# Patient Record
Sex: FEMALE | Race: WHITE
[De-identification: names, ages, dates, MRNs, and addresses within clinical notes are randomized per-mention and may not be internally consistent; named-entity substitution may affect disease eponyms.]

---

## 2019-10-14 ENCOUNTER — HOSPITAL ENCOUNTER (EMERGENCY)
Dept: HOSPITAL 41 - JD.ED | Age: 44
Discharge: HOME | End: 2019-10-14
Payer: SELF-PAY

## 2019-10-14 DIAGNOSIS — K27.9: ICD-10-CM

## 2019-10-14 DIAGNOSIS — R11.2: Primary | ICD-10-CM

## 2019-10-14 DIAGNOSIS — R10.13: ICD-10-CM

## 2019-10-14 DIAGNOSIS — Z79.899: ICD-10-CM

## 2019-10-14 DIAGNOSIS — K90.9: ICD-10-CM

## 2019-10-14 DIAGNOSIS — Z88.8: ICD-10-CM

## 2019-10-14 DIAGNOSIS — I10: ICD-10-CM

## 2019-10-14 DIAGNOSIS — K21.9: ICD-10-CM

## 2019-10-14 DIAGNOSIS — R10.11: ICD-10-CM

## 2019-10-14 PROCEDURE — 81001 URINALYSIS AUTO W/SCOPE: CPT

## 2019-10-14 PROCEDURE — 83735 ASSAY OF MAGNESIUM: CPT

## 2019-10-14 PROCEDURE — 85007 BL SMEAR W/DIFF WBC COUNT: CPT

## 2019-10-14 PROCEDURE — 96374 THER/PROPH/DIAG INJ IV PUSH: CPT

## 2019-10-14 PROCEDURE — 83690 ASSAY OF LIPASE: CPT

## 2019-10-14 PROCEDURE — 80053 COMPREHEN METABOLIC PANEL: CPT

## 2019-10-14 PROCEDURE — 36415 COLL VENOUS BLD VENIPUNCTURE: CPT

## 2019-10-14 PROCEDURE — 82977 ASSAY OF GGT: CPT

## 2019-10-14 PROCEDURE — 85027 COMPLETE CBC AUTOMATED: CPT

## 2019-10-14 PROCEDURE — 86140 C-REACTIVE PROTEIN: CPT

## 2019-10-14 PROCEDURE — 96375 TX/PRO/DX INJ NEW DRUG ADDON: CPT

## 2019-10-14 PROCEDURE — 99284 EMERGENCY DEPT VISIT MOD MDM: CPT

## 2019-10-14 PROCEDURE — 96361 HYDRATE IV INFUSION ADD-ON: CPT

## 2019-10-14 NOTE — EDM.PDOC
ED HPI GENERAL MEDICAL PROBLEM





- General


Chief Complaint: Gastrointestinal Problem


Stated Complaint: BLOOD IN STOOL/VOMITING/DIARRHEA/PAIN


Time Seen by Provider: 10/14/19 08:51


Source of Information: Reports: Patient


History Limitations: Reports: No Limitations





- History of Present Illness


INITIAL COMMENTS - FREE TEXT/NARRATIVE: 





44-year-old female presents the ED with recurrent nausea and vomiting and 

diarrhea development overnight. There is some blood in the stool. She has 

alternating hard constipated stools with diarrhea stools. She is being worked 

up at present for peptic ulcer disease as well as sludge within her 

gallbladder. She is booked for upper GI endoscopy tomorrow with . Been 

up all night vomiting and having diarrhea. Diffuse abdominal cramps but mostly 

the pain is right upper quadrant epigastric rating around the right costal 

margin into her right flank area. Characteristic of  biliary colic. She did 

have potato soup yesterday which didn't seem to agree with her. Emesis 

overnight has been all bilious without blood.


Onset: Sudden


Onset Date: 10/13/19


Onset Time: 20:00


Duration: Hour(s):


Location: Reports: Abdomen


Quality: Reports: Ache (Recurrent nausea vomiting and severe right upper 

quadrant abdominal pain associated diarrhea with flecks of blood in it.), 

Pressure, Throbbing, Other


Severity: Severe (Colicky component to the right upper quadrant abdominal pain)


Improves with: Reports: None


Worsens with: Reports: Other


Associated Symptoms: Reports: No Other Symptoms, Loss of Appetite, Malaise, 

Nausea/Vomiting, Other (Diarrhea stools 4 overnight.).  Denies: Confusion, 

Chest Pain, Cough, cough w sputum, Diaphoresis, Fever/Chills, Headaches, Rash, 

Shortness of Breath, Syncope


Treatments PTA: Reports: Other (see below) (None.)


  ** Right Upper Abdominal


Pain Score (Numeric/FACES): 9





- Related Data


 Allergies











Allergy/AdvReac Type Severity Reaction Status Date / Time


 


sumatriptan [From Imitrex] Allergy  Respiratory Verified 10/14/19 08:50





   Distress  











Home Meds: 


 Home Meds





Dicyclomine [Bentyl] 20 mg PO Q6H PRN #8 tablet 10/14/19 [Rx]


Lisinopril 10 mg PO DAILY 10/14/19 [History]


Ondansetron [Zofran] 4 mg BUCCAL Q6H PRN #8 tab 10/14/19 [Rx]


Ranitidine HCl [Zantac 75] 75 mg PO DAILY 10/14/19 [History]


Sucralfate [Carafate] 1 gm PO DAILY 10/14/19 [History]


oxyCODONE HCl/Acetaminophen [Percocet 5-325 mg Tablet] 1 - 2 each PO Q4H PRN #

12 tablet 10/14/19 [Rx]











Past Medical History


Cardiovascular History: Reports: Hypertension


Gastrointestinal History: Reports: GERD, PUD.  Denies: Cholelithiasis (

Diagnosis sludge in her gallbladder.)





Social & Family History





- Living Situation & Occupation


Living situation: Reports: Single


Occupation: Unemployed





ED ROS GENERAL





- Review of Systems


Review Of Systems: See Below


Constitutional: Reports: Chills, Malaise, Weakness, Fatigue, Decreased Appetite

, Weight Loss.  Denies: Fever


HEENT: Reports: No Symptoms


Respiratory: Reports: No Symptoms


Cardiovascular: Reports: Blood Pressure Problem


Endocrine: Reports: Fatigue


GI/Abdominal: Reports: Abdominal Pain (Right upper quadrant abdominal pain 

rating to her right flank along the right costal margin characteristic of 

biliary colic.), Constipation (Bowel syndrome with combination of constipation 

diarrhea.), Hematochezia


: Reports: No Symptoms


Musculoskeletal: Reports: No Symptoms


Skin: Reports: No Symptoms


Neurological: Reports: No Symptoms


Psychiatric: Reports: No Symptoms





ED EXAM, GI/ABD





- Physical Exam


Exam: See Below


Exam Limited By: No Limitations


General Appearance: Alert, WD/WN, Moderate Distress


Eyes: Bilateral: Normal Appearance


Throat/Mouth: Other


Head: Atraumatic, Normocephalic


Neck: Normal Inspection, Supple, Non-Tender, Full Range of Motion.  No: 

Lymphadenopathy (R), Tender Midline


Respiratory/Chest: No Respiratory Distress, Lungs Clear, Normal Breath Sounds, 

No Accessory Muscle Use, Chest Non-Tender


Cardiovascular: Normal Peripheral Pulses, No Edema, No Gallop, No Murmur, No Rub

, Tachycardia


GI/Abdominal Exam: No Organomegaly, Guarding, Tender, Abnormal Bowel Sounds (

Bowel sounds are quite active in all 4 quadrants.), Other (Positive Greco's 

sign.).  No: Rigid, Rebound (Very tender to palpation epigastric right upper 

quadrant of the abdomen with guarding)


Extremities: Normal Range of Motion, Non-Tender


Neurological: Alert, Oriented, CN II-XII Intact, Normal Cognition, No Motor/

Sensory Deficits


Psychiatric: Anxious, Other


Skin Exam: Warm, Dry, Intact (In a lot of pain.), Normal Color, No Rash





Course





- Vital Signs


Last Recorded V/S: 


 Last Vital Signs











Temp  37.0 C   10/14/19 10:20


 


Pulse  76   10/14/19 10:20


 


Resp  16   10/14/19 10:20


 


BP  121/78   10/14/19 10:20


 


Pulse Ox  98   10/14/19 10:20














- Orders/Labs/Meds


Labs: 


 Laboratory Tests











  10/14/19 10/14/19 10/14/19 Range/Units





  09:05 09:05 10:05 


 


WBC  8.03    (3.98-10.04)  K/mm3


 


RBC  5.01    (3.98-5.22)  M/mm3


 


Hgb  12.9    (11.2-15.7)  gm/dl


 


Hct  40.1    (34.1-44.9)  %


 


MCV  80.0    (79.4-94.8)  fl


 


MCH  25.7    (25.6-32.2)  pg


 


MCHC  32.2    (32.2-35.5)  g/dl


 


RDW Std Deviation  38.1    (36.4-46.3)  fL


 


Plt Count  383 H    (182-369)  K/mm3


 


MPV  11.2    (9.4-12.3)  fl


 


Neutrophils % (Manual)  65 H    (40-60)  %


 


Band Neutrophils %  0    (0-10)  %


 


Lymphocytes % (Manual)  27    (20-40)  %


 


Atypical Lymphs %  0    %


 


Immat Monocytes % (Man)  0    


 


Monocytes % (Manual)  6    (2-10)  %


 


Eosinophils % (Manual)  1    (0.7-5.8)  %


 


Basophils % (Manual)  1    (0.1-1.2)  


 


Metamyelocytes %  0    


 


Myelocytes %  0    


 


Promyelocytes %  0    


 


Blast Cells %  0    


 


Plasma Cell % (Manual)  0    


 


Nucleated RBCs  0.0    %


 


Platelet Estimate  Adequate    


 


RBC Morph Comment  Normal    


 


Sodium   141   (136-145)  mEq/L


 


Potassium   4.1   (3.5-5.1)  mEq/L


 


Chloride   104   ()  mEq/L


 


Carbon Dioxide   26   (21-32)  mEq/L


 


Anion Gap   15.1 H   (5-15)  


 


BUN   9   (7-18)  mg/dL


 


Creatinine   0.9   (0.55-1.02)  mg/dL


 


Est Cr Clr Drug Dosing   75.45   mL/min


 


Estimated GFR (MDRD)   > 60   (>60)  mL/min


 


BUN/Creatinine Ratio   10.0 L   (14-18)  


 


Glucose   97   ()  mg/dL


 


Calcium   9.1   (8.5-10.1)  mg/dL


 


Magnesium   2.0   (1.8-2.4)  mg/dl


 


Total Bilirubin   0.3   (0.2-1.0)  mg/dL


 


GGT   11   (5-55)  U/L


 


AST   16   (15-37)  U/L


 


ALT   23   (14-59)  U/L


 


Alkaline Phosphatase   86   ()  U/L


 


C-Reactive Protein   < 0.2   (<1.0)  mg/dL


 


Total Protein   7.1   (6.4-8.2)  g/dl


 


Albumin   4.0   (3.4-5.0)  g/dl


 


Globulin   3.1   gm/dL


 


Albumin/Globulin Ratio   1.3   (1-2)  


 


Lipase   191   ()  U/L


 


Urine Color    Yellow  (Yellow)  


 


Urine Appearance    Clear  (Clear)  


 


Urine pH    7.0  (5.0-8.0)  


 


Ur Specific Gravity    1.010  (1.005-1.030)  


 


Urine Protein    Negative  (Negative)  


 


Urine Glucose (UA)    Negative  (Negative)  


 


Urine Ketones    Negative  (Negative)  


 


Urine Occult Blood    Negative  (Negative)  


 


Urine Nitrite    Negative  (Negative)  


 


Urine Bilirubin    Negative  (Negative)  


 


Urine Urobilinogen    0.2  (0.2-1.0)  


 


Ur Leukocyte Esterase    Negative  (Negative)  


 


Urine RBC    Not seen  (0-5)  /hpf


 


Urine WBC    Not seen  (0-5)  /hpf


 


Ur Squamous Epith Cells    Not seen  (0-5)  /hpf


 


Urine Bacteria    Not seen  (FEW)  /hpf


 


Urine Mucus    Not seen  (FEW)  /hpf











Meds: 


Medications














Discontinued Medications














Generic Name Dose Route Start Last Admin





  Trade Name Freq  PRN Reason Stop Dose Admin


 


Hydromorphone HCl  1 mg  10/14/19 08:57  10/14/19 09:04





  Dilaudid  IVPUSH  10/14/19 08:58  1 mg





  ONETIME ONE   Administration





     





     





     





     


 


Hyoscyamine  0.125 mg  10/14/19 08:58  10/14/19 09:04





  Hyomax-Sl  SL  10/14/19 08:59  0.125 mg





  ONETIME ONE   Administration





     





     





     





     


 


Dextrose/Lactated Ringer's  1,000 mls @ 999 mls/hr  10/14/19 09:00  10/14/19 09:

04





  Dextrose 5%-Lactated Ringers  IV   999 mls/hr





  ASDIRECTED JULIA   Administration





     





     





     





     


 


Metoclopramide HCl  7.5 mg  10/14/19 08:57  10/14/19 09:04





  Reglan  IVPUSH  10/14/19 08:58  7.5 mg





  ONETIME ONE   Administration





     





     





     





     














- Radiology Interpretation


Free Text/Narrative:: 


43-year-old female presents to the ED with a host of symptoms. I history she's 

been having positive biliary colic and is getting this looked into. She has 

biliary sludge within her gallbladder but no defined stones. She also 

apparently has peptic ulcer disease and is being treated with ranitidine and 

sucralfate at present. She is booked for upper GI endoscopy tomorrow. She 

states since eating some potato soup yesterday and seemed to set up her 

gallbladder and she presents with severe epigastric right upper quadrant 

abdominal pain radiating along the right costal margin to the right flank. 

Associated nausea and vomiting of bilious material 6 overnight. 2 diarrhea 

stools with flecks of blood noted. But not near as bad as what she's 

experienced in the past. There is no melena. Port stools have been a 

combination of constipation and diarrhea. She is afebrile. Plan IV D5 Ringer's 

lactate at open. Given Reglan 7.5 mg IV with Dilaudid 1 mg IV for acute pain 

relief from her gallbladder. Routine labs to be performed. Given Levsin 0.125 

mg sublingual to see if this will help alleviate painful bit sooner than the 

Dilaudid.





- Re-Assessments/Exams


Free Text/Narrative Re-Assessment/Exam: 





10/14/19 09:52White count is 8.03 with 65% neutrophils and no band cells 

reported. Hemoglobin is 12.9 with hematocrit of 40.1. MCV is 80. It is 383,000. 

Patient states the nausea settle down and the pain is much improved.





10/14/19 10:11 Chemistry shows a sodium of 141 potassium 4.1 chloride 104 

bicarbonate 26. And a gap is 15.1. BUN is 9 with a creatinine of 0.9. GFR 

remains greater than 60. Glucose is 97 with a calcium of 9.1. Magnesium is 2.0. 

Total bilirubin is 0.3 liver function otherwise normal. GTT is pending. C-

reactive protein is less than 0.2. Total protein 7.1 albumin 4.0 lipase is 

normal at 191.





10/14/19 10:19 feeling much improved after a liter of fluids. Going to 

discharge her home with Zofran 4 mg sublingually every 4-6 hours when necessary 

enteric arrest nausea vomiting. Bentyl 20 mg every 6 hours needed for relief of 

abdominal pain cramping. Tabs 5/325 mg one or 2 every 4-6 hours needed for 

relief of severe pain related to gallbladder attack. A day diet will be mostly 

clear fluids to advance to light diet such as turkey rice chicken noodle soup 

first supper tonight. She has to be nothing by mouth after midnight for her 

upper GI endoscopy tomorrow.








Departure





- Departure


Time of Disposition: 10:20


Disposition: Home, Self-Care 01


Condition: Fair


Clinical Impression: 


 Biliary colic symptom, Intractable nausea and vomiting





Diarrhea


Qualifiers:


 Diarrhea type: due to malabsorption Qualified Code(s): K90.9 - Intestinal 

malabsorption, unspecified








- Discharge Information


*PRESCRIPTION DRUG MONITORING PROGRAM REVIEWED*: No


*COPY OF PRESCRIPTION DRUG MONITORING REPORT IN PATIENT MIKKI: No


Prescriptions: 


Dicyclomine [Bentyl] 20 mg PO Q6H PRN #8 tablet


 PRN Reason: Abdominal cramps/diarrhea


Ondansetron [Zofran] 4 mg BUCCAL Q6H PRN #8 tab


 PRN Reason: nausea or vomiting


oxyCODONE HCl/Acetaminophen [Percocet 5-325 mg Tablet] 1 - 2 each PO Q4H PRN #

12 tablet


 PRN Reason: pain relief. 


Instructions:  Biliary Colic, Adult, Nausea and Vomiting, Adult


Referrals: 


Zulma Cardenas PA-C [Primary Care Provider] - 


Forms:  ED Department Discharge, ED Return to Work/School Form


Additional Instructions: 


Evaluation the emergency room this morning regards to intractable nausea and 

vomiting that is gone on all night long with bilious emesis primarily. Emesis 

precipitated by right upper quadrant epigastric abdominal pain suspicious for 

biliary colic. Had an ultrasound which did not confirm stones but suggested 

sludge. HIDA scan is probably in order to see if the gallbladder is functioning 

normally. An upper GI endoscopy which is to be performed tomorrow. He was to 

rule out peptic ulcer disease and/or H. pylori infection. You're treated with a 

liter of IV fluids in the emergency department and medications Reglan 7.5 mg to 

stop vomiting and Dilaudid 1 mg IV for relief of abdominal pain. His diet today 

to be clear fluids such as Gatorade/Powerade. Dense diet as tolerated to 

crackers then bread with jam on it etc. May advance to soup broth or turkey rice

/chicken noodle soup for supper. If nausea returns to take Zofran 4 mg under 

the tongue every 4-6 hours for nausea vomiting relief. Try Bentyl 20 mg every 6 

hours as needed for relief of abdominal pain. If this fails within the hour 

take Percocet tabs 5/325 milligram tablet--one or 2 tablets every 4-6 hours 

necessary for pain relief. Nothing to eat after midnight in preparation for 

upper GI endoscopy tomorrow.

## 2023-07-05 ENCOUNTER — OFFICE VISIT (OUTPATIENT)
Dept: FAMILY MEDICINE | Facility: CLINIC | Age: 48
End: 2023-07-05

## 2023-07-05 VITALS
TEMPERATURE: 99 F | OXYGEN SATURATION: 100 % | SYSTOLIC BLOOD PRESSURE: 135 MMHG | WEIGHT: 136 LBS | HEART RATE: 90 BPM | BODY MASS INDEX: 22.66 KG/M2 | DIASTOLIC BLOOD PRESSURE: 85 MMHG | RESPIRATION RATE: 18 BRPM | HEIGHT: 65 IN

## 2023-07-05 DIAGNOSIS — M25.50 PAIN, JOINT, MULTIPLE SITES: ICD-10-CM

## 2023-07-05 DIAGNOSIS — K59.00 CONSTIPATION, UNSPECIFIED CONSTIPATION TYPE: ICD-10-CM

## 2023-07-05 DIAGNOSIS — R10.84 GENERALIZED ABDOMINAL PAIN: ICD-10-CM

## 2023-07-05 DIAGNOSIS — Z13.220 SCREENING FOR LIPID DISORDERS: ICD-10-CM

## 2023-07-05 DIAGNOSIS — R11.2 NAUSEA AND VOMITING, UNSPECIFIED VOMITING TYPE: ICD-10-CM

## 2023-07-05 DIAGNOSIS — K22.719 BARRETT'S ESOPHAGUS WITH DYSPLASIA: Primary | ICD-10-CM

## 2023-07-05 DIAGNOSIS — K31.A0 INTESTINAL METAPLASIA OF STOMACH: ICD-10-CM

## 2023-07-05 LAB
ALBUMIN SERPL BCP-MCNC: 3.8 G/DL (ref 3.5–5)
ALBUMIN/GLOB SERPL: 1.2 {RATIO}
ALP SERPL-CCNC: 79 U/L (ref 39–100)
ALT SERPL W P-5'-P-CCNC: 26 U/L (ref 13–56)
ANION GAP SERPL CALCULATED.3IONS-SCNC: 8 MMOL/L (ref 7–16)
AST SERPL W P-5'-P-CCNC: 17 U/L (ref 15–37)
BASOPHILS # BLD AUTO: 0.07 K/UL (ref 0–0.2)
BASOPHILS NFR BLD AUTO: 1.1 % (ref 0–1)
BILIRUB SERPL-MCNC: 0.2 MG/DL (ref ?–1.2)
BUN SERPL-MCNC: 25 MG/DL (ref 7–18)
BUN/CREAT SERPL: 32 (ref 6–20)
CALCIUM SERPL-MCNC: 9 MG/DL (ref 8.5–10.1)
CHLORIDE SERPL-SCNC: 107 MMOL/L (ref 98–107)
CHOLEST SERPL-MCNC: 182 MG/DL (ref 0–200)
CHOLEST/HDLC SERPL: 2.6 {RATIO}
CO2 SERPL-SCNC: 29 MMOL/L (ref 21–32)
CREAT SERPL-MCNC: 0.79 MG/DL (ref 0.55–1.02)
CRP SERPL-MCNC: <0.29 MG/DL (ref 0–0.8)
DIFFERENTIAL METHOD BLD: ABNORMAL
EGFR (NO RACE VARIABLE) (RUSH/TITUS): 93 ML/MIN/1.73M2
EOSINOPHIL # BLD AUTO: 0.19 K/UL (ref 0–0.5)
EOSINOPHIL NFR BLD AUTO: 3 % (ref 1–4)
ERYTHROCYTE [DISTWIDTH] IN BLOOD BY AUTOMATED COUNT: 13 % (ref 11.5–14.5)
ERYTHROCYTE [SEDIMENTATION RATE] IN BLOOD BY WESTERGREN METHOD: 23 MM/HR (ref 0–20)
GLOBULIN SER-MCNC: 3.3 G/DL (ref 2–4)
GLUCOSE SERPL-MCNC: 90 MG/DL (ref 74–106)
HCT VFR BLD AUTO: 38.8 % (ref 38–47)
HDLC SERPL-MCNC: 71 MG/DL (ref 40–60)
HGB BLD-MCNC: 12 G/DL (ref 12–16)
IMM GRANULOCYTES # BLD AUTO: 0.02 K/UL (ref 0–0.04)
IMM GRANULOCYTES NFR BLD: 0.3 % (ref 0–0.4)
LDLC SERPL CALC-MCNC: 97 MG/DL
LDLC/HDLC SERPL: 1.4 {RATIO}
LYMPHOCYTES # BLD AUTO: 2.34 K/UL (ref 1–4.8)
LYMPHOCYTES NFR BLD AUTO: 36.8 % (ref 27–41)
MCH RBC QN AUTO: 25.5 PG (ref 27–31)
MCHC RBC AUTO-ENTMCNC: 30.9 G/DL (ref 32–36)
MCV RBC AUTO: 82.6 FL (ref 80–96)
MONOCYTES # BLD AUTO: 0.52 K/UL (ref 0–0.8)
MONOCYTES NFR BLD AUTO: 8.2 % (ref 2–6)
MPC BLD CALC-MCNC: 11.4 FL (ref 9.4–12.4)
NEUTROPHILS # BLD AUTO: 3.22 K/UL (ref 1.8–7.7)
NEUTROPHILS NFR BLD AUTO: 50.6 % (ref 53–65)
NONHDLC SERPL-MCNC: 111 MG/DL
NRBC # BLD AUTO: 0 X10E3/UL
NRBC, AUTO (.00): 0 %
PLATELET # BLD AUTO: 317 K/UL (ref 150–400)
POTASSIUM SERPL-SCNC: 4.4 MMOL/L (ref 3.5–5.1)
PROT SERPL-MCNC: 7.1 G/DL (ref 6.4–8.2)
RBC # BLD AUTO: 4.7 M/UL (ref 4.2–5.4)
RHEUMATOID FACT SER NEPH-ACNC: NEGATIVE [IU]/ML
SODIUM SERPL-SCNC: 140 MMOL/L (ref 136–145)
TRIGL SERPL-MCNC: 70 MG/DL (ref 35–150)
URATE SERPL-MCNC: 3.3 MG/DL (ref 2.6–6)
UREA BREATH TEST QL: NEGATIVE
VLDLC SERPL-MCNC: 14 MG/DL
WBC # BLD AUTO: 6.36 K/UL (ref 4.5–11)

## 2023-07-05 PROCEDURE — 85025 CBC WITH DIFFERENTIAL: ICD-10-PCS | Mod: ,,, | Performed by: CLINICAL MEDICAL LABORATORY

## 2023-07-05 PROCEDURE — 86430 RHEUMATOID FACTOR TEST QUAL: CPT | Mod: ,,, | Performed by: CLINICAL MEDICAL LABORATORY

## 2023-07-05 PROCEDURE — 80053 COMPREHENSIVE METABOLIC PANEL: ICD-10-PCS | Mod: ,,, | Performed by: CLINICAL MEDICAL LABORATORY

## 2023-07-05 PROCEDURE — 86038 ANA EIA W/REFLEX DSDNA/ENA: ICD-10-PCS | Mod: ,,, | Performed by: CLINICAL MEDICAL LABORATORY

## 2023-07-05 PROCEDURE — 85025 COMPLETE CBC W/AUTO DIFF WBC: CPT | Mod: ,,, | Performed by: CLINICAL MEDICAL LABORATORY

## 2023-07-05 PROCEDURE — 80061 LIPID PANEL: ICD-10-PCS | Mod: ,,, | Performed by: CLINICAL MEDICAL LABORATORY

## 2023-07-05 PROCEDURE — 85651 SEDIMENTATION RATE, AUTOMATED: ICD-10-PCS | Mod: ,,, | Performed by: CLINICAL MEDICAL LABORATORY

## 2023-07-05 PROCEDURE — 99204 OFFICE O/P NEW MOD 45 MIN: CPT | Mod: ,,, | Performed by: FAMILY MEDICINE

## 2023-07-05 PROCEDURE — 80061 LIPID PANEL: CPT | Mod: ,,, | Performed by: CLINICAL MEDICAL LABORATORY

## 2023-07-05 PROCEDURE — 86140 C-REACTIVE PROTEIN: ICD-10-PCS | Mod: ,,, | Performed by: CLINICAL MEDICAL LABORATORY

## 2023-07-05 PROCEDURE — 83013 H. PYLORI BREATH TEST: ICD-10-PCS | Mod: ,,, | Performed by: CLINICAL MEDICAL LABORATORY

## 2023-07-05 PROCEDURE — 84550 ASSAY OF BLOOD/URIC ACID: CPT | Mod: ,,, | Performed by: CLINICAL MEDICAL LABORATORY

## 2023-07-05 PROCEDURE — 86140 C-REACTIVE PROTEIN: CPT | Mod: ,,, | Performed by: CLINICAL MEDICAL LABORATORY

## 2023-07-05 PROCEDURE — 83013 H PYLORI (C-13) BREATH: CPT | Mod: ,,, | Performed by: CLINICAL MEDICAL LABORATORY

## 2023-07-05 PROCEDURE — 86430 RHEUMATOID FACTOR SCREEN: ICD-10-PCS | Mod: ,,, | Performed by: CLINICAL MEDICAL LABORATORY

## 2023-07-05 PROCEDURE — 80053 COMPREHEN METABOLIC PANEL: CPT | Mod: ,,, | Performed by: CLINICAL MEDICAL LABORATORY

## 2023-07-05 PROCEDURE — 99204 PR OFFICE/OUTPT VISIT, NEW, LEVL IV, 45-59 MIN: ICD-10-PCS | Mod: ,,, | Performed by: FAMILY MEDICINE

## 2023-07-05 PROCEDURE — 84550 URIC ACID: ICD-10-PCS | Mod: ,,, | Performed by: CLINICAL MEDICAL LABORATORY

## 2023-07-05 PROCEDURE — 85651 RBC SED RATE NONAUTOMATED: CPT | Mod: ,,, | Performed by: CLINICAL MEDICAL LABORATORY

## 2023-07-05 PROCEDURE — 86038 ANTINUCLEAR ANTIBODIES: CPT | Mod: ,,, | Performed by: CLINICAL MEDICAL LABORATORY

## 2023-07-05 RX ORDER — SUCRALFATE 1 G/10ML
1 SUSPENSION ORAL 4 TIMES DAILY
Qty: 1200 ML | Refills: 5 | Status: SHIPPED | OUTPATIENT
Start: 2023-07-05

## 2023-07-05 RX ORDER — PROMETHAZINE HYDROCHLORIDE 25 MG/1
25 TABLET ORAL EVERY 6 HOURS PRN
Qty: 60 TABLET | Refills: 5 | Status: SHIPPED | OUTPATIENT
Start: 2023-07-05

## 2023-07-05 RX ORDER — ESOMEPRAZOLE MAGNESIUM 40 MG/1
40 CAPSULE, DELAYED RELEASE ORAL
Qty: 30 CAPSULE | Refills: 5 | Status: SHIPPED | OUTPATIENT
Start: 2023-07-05 | End: 2024-07-04

## 2023-07-05 RX ORDER — FAMOTIDINE 20 MG/1
20 TABLET, FILM COATED ORAL 2 TIMES DAILY
Qty: 60 TABLET | Refills: 11 | Status: SHIPPED | OUTPATIENT
Start: 2023-07-05 | End: 2024-07-04

## 2023-07-05 NOTE — PROGRESS NOTES
New Clinic Note    Bisi Ramírez is a 48 y.o. female     CC:   Chief Complaint   Patient presents with    Abdominal Pain     New patient to this clinic referred by her cousin Adalberto Woods. Stated she has been back in [facility] for past two years and needs to establish with a PCP. Has long history of Barretts Esophagus, GERD,h/o H. Pylori, Intestinal dysplasia, constipation, fatty liver, and PTSD. Stated she has not been on any medication. Stated she needs to get back on her blood pressure medication, and reflux medications and wants Phenergan restarted. Needs EGD and C-Scope. Last on 2019 and was instructed to repeat.    Neck Pain     Stated she had a lot of broke bones in her past. Complains of neck pain and right arm pain. Stated she has history of  chronic arthritis. Has marijuana card from [place] that she has not been able to use here yet. Patient is Hard of hearing.         Subjective    History of Present Illness HPI   Patient  complains of abdominal pain and constipation. She has a history of Jacobs's esophagus and intestinal metaplasia. She needs to be restarted on a PPI and carafate. She has taken miralax without relief. She needs a referral to GI. She complains of chronic joint pain. She has used OTC cream with some relief.       Current Outpatient Medications:     esomeprazole (NEXIUM) 40 MG capsule, Take 1 capsule (40 mg total) by mouth before breakfast., Disp: 30 capsule, Rfl: 5    famotidine (PEPCID) 20 MG tablet, Take 1 tablet (20 mg total) by mouth 2 (two) times daily., Disp: 60 tablet, Rfl: 11    promethazine (PHENERGAN) 25 MG tablet, Take 1 tablet (25 mg total) by mouth every 6 (six) hours as needed for Nausea., Disp: 60 tablet, Rfl: 5    sucralfate (CARAFATE) 100 mg/mL suspension, Take 10 mLs (1 g total) by mouth 4 (four) times daily., Disp: 1200 mL, Rfl: 5     Past Medical History:   Diagnosis Date    Anxiety     Arthritis     Depression     GERD (gastroesophageal reflux disease)      "Hypertension         Family History   Problem Relation Age of Onset    Mental illness Mother     Drug abuse Mother     Sleep apnea Father     Hypertension Father     COPD Father         Past Surgical History:   Procedure Laterality Date    CHOLECYSTECTOMY      FOOT SURGERY  2009    HYSTERECTOMY      TONSILLECTOMY          Review of Systems   Constitutional:  Negative for fatigue and fever.   HENT:  Negative for ear pain, postnasal drip, rhinorrhea and sinus pressure/congestion.    Respiratory:  Negative for cough and shortness of breath.    Cardiovascular:  Negative for chest pain.   Gastrointestinal:  Positive for abdominal pain and constipation. Negative for diarrhea, nausea and vomiting.   Genitourinary:  Negative for dysuria.   Neurological:  Negative for headaches.        /85 (BP Location: Left arm, Patient Position: Sitting, BP Method: Large (Automatic))   Pulse 90   Temp 99 °F (37.2 °C) (Oral)   Resp 18   Ht 5' 5.2" (1.656 m)   Wt 61.7 kg (136 lb)   SpO2 100%   BMI 22.49 kg/m²      Physical Exam  HENT:      Head: Normocephalic and atraumatic.      Mouth/Throat:      Pharynx: Oropharynx is clear.   Cardiovascular:      Rate and Rhythm: Normal rate and regular rhythm.   Pulmonary:      Effort: Pulmonary effort is normal.      Breath sounds: Normal breath sounds.   Abdominal:      General: Abdomen is flat. Bowel sounds are normal.      Palpations: Abdomen is soft.      Tenderness: There is generalized abdominal tenderness.   Neurological:      Mental Status: She is alert and oriented to person, place, and time.   Psychiatric:         Mood and Affect: Mood normal.         Behavior: Behavior normal.          Assessment and Plan      ICD-10-CM ICD-9-CM   1. Jacobs's esophagus with dysplasia  K22.719 530.85   2. Intestinal metaplasia of stomach  K31.A0 537.89   3. Generalized abdominal pain  R10.84 789.07   4. Screening for lipid disorders  Z13.220 V77.91   5. Nausea and vomiting, unspecified vomiting " type  R11.2 787.01   6. Constipation, unspecified constipation type  K59.00 564.00   7. Pain, joint, multiple sites  M25.50 719.49        1. Jacobs's esophagus with dysplasia  Not controlled. Refer to gastroenterology.  -     esomeprazole (NEXIUM) 40 MG capsule; Take 1 capsule (40 mg total) by mouth before breakfast.  Dispense: 30 capsule; Refill: 5  -     sucralfate (CARAFATE) 100 mg/mL suspension; Take 10 mLs (1 g total) by mouth 4 (four) times daily.  Dispense: 1200 mL; Refill: 5  -     famotidine (PEPCID) 20 MG tablet; Take 1 tablet (20 mg total) by mouth 2 (two) times daily.  Dispense: 60 tablet; Refill: 11  -     Ambulatory referral/consult to Gastroenterology; Future; Expected date: 07/12/2023    2. Intestinal metaplasia of stomach  Not controlled. Refer to gastroenterology.  -     esomeprazole (NEXIUM) 40 MG capsule; Take 1 capsule (40 mg total) by mouth before breakfast.  Dispense: 30 capsule; Refill: 5  -     sucralfate (CARAFATE) 100 mg/mL suspension; Take 10 mLs (1 g total) by mouth 4 (four) times daily.  Dispense: 1200 mL; Refill: 5  -     famotidine (PEPCID) 20 MG tablet; Take 1 tablet (20 mg total) by mouth 2 (two) times daily.  Dispense: 60 tablet; Refill: 11  -     Ambulatory referral/consult to Gastroenterology; Future; Expected date: 07/12/2023    3. Generalized abdominal pain  -     CBC Auto Differential; Future; Expected date: 07/05/2023  -     Comprehensive Metabolic Panel; Future; Expected date: 07/05/2023  -     H. pylori Breath Test; Future; Expected date: 07/05/2023    4. Screening for lipid disorders  -     Lipid Panel; Future; Expected date: 07/05/2023    5. Nausea and vomiting, unspecified vomiting type  -     promethazine (PHENERGAN) 25 MG tablet; Take 1 tablet (25 mg total) by mouth every 6 (six) hours as needed for Nausea.  Dispense: 60 tablet; Refill: 5    6. Constipation, unspecified constipation type      7. Pain, joint, multiple sites  -     Uric Acid; Future; Expected date:  07/05/2023  -     Rheumatoid Factor Screen; Future; Expected date: 07/05/2023  -     C-Reactive Protein; Future; Expected date: 07/05/2023  -     MARCELA EIA w/ Reflex to dsDNA/BRITTON; Future; Expected date: 07/05/2023  -     Sedimentation Rate; Future; Expected date: 07/05/2023        Follow up if symptoms worsen or fail to improve.

## 2023-07-06 LAB — ANA SER QL: NEGATIVE

## 2023-08-11 PROBLEM — K31.A0 INTESTINAL METAPLASIA OF STOMACH: Status: ACTIVE | Noted: 2023-08-11

## 2023-08-11 PROBLEM — K22.719 BARRETT'S ESOPHAGUS WITH DYSPLASIA: Status: ACTIVE | Noted: 2023-08-11
